# Patient Record
Sex: MALE | Race: WHITE
[De-identification: names, ages, dates, MRNs, and addresses within clinical notes are randomized per-mention and may not be internally consistent; named-entity substitution may affect disease eponyms.]

---

## 2019-02-04 ENCOUNTER — HOSPITAL ENCOUNTER (EMERGENCY)
Dept: HOSPITAL 80 - CED | Age: 49
Discharge: HOME | End: 2019-02-04
Payer: COMMERCIAL

## 2019-02-04 VITALS — DIASTOLIC BLOOD PRESSURE: 85 MMHG | SYSTOLIC BLOOD PRESSURE: 128 MMHG
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
## 2019-02-04 DIAGNOSIS — S61.211A: Primary | ICD-10-CM
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
## 2019-02-04 DIAGNOSIS — Z23: ICD-10-CM

## 2019-02-04 PROCEDURE — 0HQGXZZ REPAIR LEFT HAND SKIN, EXTERNAL APPROACH: ICD-10-PCS | Performed by: EMERGENCY MEDICINE

## 2019-02-04 NOTE — EDPHY
H & P


Time Seen by Provider: 02/04/19 14:58


HPI/ROS: 





This patient sustained a laceration inverting lead to his left 2nd finger while 

at work.  He explains that he was using a razor blade to cut a piece of plastic 

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 

slipped and caused the laceration to his proximal left 2nd finger.  He reports 

mild pain and moderate bleeding from the injury.  He applied gauze with 

pressure prior to arrival with successful hemostasis.  He arrived by private 

vehicle.  He denies any other associated symptoms or complaints.





ROS:  Neuro:  No numbness or tingling the affected finger.


Musculoskeletal:  No difficulty moving the affected finger post laceration


5 point review of symptoms is performed and otherwise negative with exception 

of pertinent positives and negatives listed in HPI and ROS





Past Medical/Surgical History: 





He believes his last tetanus was more than 5 years ago.  He is otherwise healthy


Smoking Status: Never smoked


Physical Exam: 





Physical Exam


Vital signs are normal.


General:  No acute distress


Lungs:  No respiratory distress.


Cardiac:  Brisk capillary refill is intact throughout.  Pulses are 2+ and 

symmetric in the affected extremity.


Extremities:  Atraumatic normal except for left 2nd finger


Left 2nd finger:  Patient has a 2.5 cm full-thickness laceration to the 

proximal left 2nd finger with subcutaneous tissue evident mild bleeding.  On 

direct examination appreciate no evidence of tendon injury, foreign bodies or 

other deeper structural injuries.  Patient is able extend the finger with 5/5 

strength versus resistance.


Skin:  No rash or pallor.


Neuro:  Alert and oriented with no sensorimotor deficits in the affected finger.





Initial differential diagnosis:  Laceration-simple, tendon laceration


Constitutional: 


 Initial Vital Signs











Temperature (C)  36.9 C   02/04/19 14:09


 


Heart Rate  84   02/04/19 14:09


 


Respiratory Rate  16   02/04/19 14:09


 


Blood Pressure  149/94 H  02/04/19 14:09


 


O2 Sat (%)  92   02/04/19 14:09











Allergies/Adverse Reactions: 


 





No Known Allergies Allergy (Unverified 02/04/19 14:17)


 








Home Medications: 














 Medication  Instructions  Recorded


 


Levothyroxine  02/04/19


 


Zonisamide  02/04/19














MDM/Departure





- MDM


Procedures: 





Digital block:  After verbal consent, using a 50 50 mix of 0.5% Marcaine 2% 

plain lidocaine, 27 gauge needle, chlorhexidine scrub under sterile conditions-

3 injections were administered to the base of the affected finger, 8 mL with 

good effect.  Patient tolerated this well. There were no complications.





The wound is 2.5 cm full-thickness.  The wound was copiously irrigated with 

saline.  The wound was explored for foreign bodies and none were found.  The 

wound was prepped and draped in the normal sterile fashion.   The edges were 

reapproximated using 4 0 Prolene -8 running sutures with good hemostasis and 

cosmesis.  The patient tolerated the procedure well.  There were no 

complications.





Patient is placed in tube gauze by our tech.  I counseled patient regarding 

wound care and treatment plan.


ED Course/Re-evaluation: 





Discussion:  Simple laceration without evidence of neurovascular compromise or 

tendon injury.





- Depart


Disposition: Home, Routine, Self-Care


Clinical Impression: 


Finger laceration


Qualifiers:


 Encounter type: initial encounter Finger: index finger Damage to nail status: 

without damage Foreign body presence: without foreign body Laterality: left 

Qualified Code(s): S61.211A - Laceration without foreign body of left index 

finger without damage to nail, initial encounter





Condition: Good


Instructions:  Finger Laceration (ED)


Additional Instructions: 


Diagnosis:  Finger laceration





Plan:  Keep the finger clean and dry for the next 2 days, then removed the 

dressing and clean daily with warm soapy water





No use of the finger at work over the next 2 days, then as tolerated.





Return for suture removal in 10-12 days





Return sooner if he develops redness, discharge or any other concerns for 

infection.


Stand Alone Forms:  Work Excuse